# Patient Record
Sex: MALE | Race: BLACK OR AFRICAN AMERICAN | URBAN - METROPOLITAN AREA
[De-identification: names, ages, dates, MRNs, and addresses within clinical notes are randomized per-mention and may not be internally consistent; named-entity substitution may affect disease eponyms.]

---

## 2017-10-18 ENCOUNTER — IP HISTORICAL/CONVERTED ENCOUNTER (OUTPATIENT)
Dept: OTHER | Age: 62
End: 2017-10-18

## 2017-12-04 ENCOUNTER — OP HISTORICAL/CONVERTED ENCOUNTER (OUTPATIENT)
Dept: OTHER | Age: 62
End: 2017-12-04

## 2017-12-20 ENCOUNTER — OP HISTORICAL/CONVERTED ENCOUNTER (OUTPATIENT)
Dept: OTHER | Age: 62
End: 2017-12-20

## 2018-02-19 ENCOUNTER — OP HISTORICAL/CONVERTED ENCOUNTER (OUTPATIENT)
Dept: OTHER | Age: 63
End: 2018-02-19

## 2018-03-01 ENCOUNTER — OP HISTORICAL/CONVERTED ENCOUNTER (OUTPATIENT)
Dept: OTHER | Age: 63
End: 2018-03-01

## 2018-04-01 ENCOUNTER — OP HISTORICAL/CONVERTED ENCOUNTER (OUTPATIENT)
Dept: OTHER | Age: 63
End: 2018-04-01

## 2018-04-25 ENCOUNTER — OP HISTORICAL/CONVERTED ENCOUNTER (OUTPATIENT)
Dept: OTHER | Age: 63
End: 2018-04-25

## 2018-05-01 ENCOUNTER — OP HISTORICAL/CONVERTED ENCOUNTER (OUTPATIENT)
Dept: OTHER | Age: 63
End: 2018-05-01

## 2018-05-18 ENCOUNTER — OFFICE VISIT (OUTPATIENT)
Dept: CARDIOLOGY CLINIC | Age: 63
End: 2018-05-18

## 2018-05-18 VITALS
WEIGHT: 163.8 LBS | RESPIRATION RATE: 18 BRPM | BODY MASS INDEX: 24.26 KG/M2 | HEIGHT: 69 IN | OXYGEN SATURATION: 98 % | SYSTOLIC BLOOD PRESSURE: 160 MMHG | HEART RATE: 86 BPM | DIASTOLIC BLOOD PRESSURE: 100 MMHG

## 2018-05-18 DIAGNOSIS — R06.02 SOB (SHORTNESS OF BREATH): Primary | ICD-10-CM

## 2018-05-18 DIAGNOSIS — I50.9 CONGESTIVE HEART FAILURE, UNSPECIFIED HF CHRONICITY, UNSPECIFIED HEART FAILURE TYPE (HCC): ICD-10-CM

## 2018-05-18 RX ORDER — AMLODIPINE BESYLATE 10 MG/1
TABLET ORAL DAILY
COMMUNITY

## 2018-05-18 RX ORDER — SPIRONOLACTONE 25 MG/1
TABLET ORAL DAILY
COMMUNITY

## 2018-05-18 RX ORDER — POTASSIUM CHLORIDE 20 MEQ/1
TABLET, EXTENDED RELEASE ORAL 2 TIMES DAILY
COMMUNITY
End: 2018-06-08 | Stop reason: DRUGHIGH

## 2018-05-18 RX ORDER — FUROSEMIDE 40 MG/1
TABLET ORAL DAILY
COMMUNITY

## 2018-05-18 RX ORDER — METOPROLOL SUCCINATE 25 MG/1
TABLET, EXTENDED RELEASE ORAL DAILY
COMMUNITY

## 2018-05-18 RX ORDER — SERTRALINE HYDROCHLORIDE 100 MG/1
TABLET, FILM COATED ORAL DAILY
COMMUNITY

## 2018-05-18 RX ORDER — HYDRALAZINE HYDROCHLORIDE 25 MG/1
25 TABLET, FILM COATED ORAL 3 TIMES DAILY
COMMUNITY

## 2018-05-18 NOTE — PATIENT INSTRUCTIONS
Treatment Plan: You are scheduled for a defibrillator implant at [address] [place] [address] on Friday, June 1st.    Please arrive at the patient registration desk located on the 2nd floor of the main building at 6:00am.    Do not eat or drink anything after midnight the night before your procedure. You will need a . You may take your normal medications with a sip of water the morning of your procedure. Lab instructions: Please have labs drawn 4-7 days prior to procedure at any [facility] office. Please call Gayatri Salinas or Viola Stanley if you have any questions at [phone]. Please call the  office if you develop any type of illness prior to your procedure. Implantable Cardioverter-Defibrillator Placement: Before Your Procedure  What is an implantable cardioverter-defibrillator? An implantable cardioverter-defibrillator (ICD) is a small, battery-powered device. It fixes life-threatening changes in your heartbeat. If the ICD detects a life-threatening heart rhythm, it tries to get it back to normal. If the dangerous rhythm does not stop, the ICD sends an electric shock to the heart to restore a normal rhythm. The device then goes back to its watchful mode. The doctor puts an ICD in your chest and attaches it to thin wires, called leads. The leads carry the shocks from the ICD to the heart. Before the procedure, you will get medicine to help you relax. The doctor will make an incision (cut) in the skin just below your collarbone. The cut may be on either side of your chest. The doctor will put the ICD leads through the cut. Most of the time, the leads go into a large blood vessel in the upper chest. Then the doctor will guide the leads through the blood vessel into the heart. The doctor will place the ICD under the skin of your chest. He or she will attach the leads to the ICD. Then the cut will be closed with stitches. The procedure usually takes about an hour.  You may stay in the hospital for 1 or 2 days. You can likely return to many of your normal activities after you get an ICD. But to stay safe, you may need to make some changes to your normal routine. You will need to be careful with certain types of electronic equipment. And you'll need to take extra care with medical and dental tests and procedures. You will be given specific instructions after getting your ICD. You may feel anxious or worried about having an ICD. This is common. You might feel better if you use techniques to help you relax. Make a plan for what to do if the ICD shocks you. And think about how the ICD will help you. Talk to your doctor about ways to help ease anxiety. Follow-up care is a key part of your treatment and safety. Be sure to make and go to all appointments, and call your doctor if you are having problems. It's also a good idea to know your test results and keep a list of the medicines you take. What happens before the procedure? ?Preparing for the procedure  ? · Understand exactly what procedure is planned, along with the risks, benefits, and other options. · Tell your doctors ALL the medicines, vitamins, supplements, and herbal remedies you take. Some of these can increase the risk of bleeding or interact with anesthesia. ? · If you take blood thinners, such as warfarin (Coumadin), clopidogrel (Plavix), or aspirin, be sure to talk to your doctor. He or she will tell you if you should stop taking these medicines before your procedure. Make sure that you understand exactly what your doctor wants you to do.   ? · Your doctor will tell you which medicines to take or stop before your procedure. You may need to stop taking certain medicines a week or more before the procedure. So talk to your doctor as soon as you can.   ? · If you have an advance directive, let your doctor know. It may include a living will and a durable power of  for health care.  Bring a copy to the hospital. If you don't have one, you may want to prepare one. It lets your doctor and loved ones know your health care wishes. Doctors advise that everyone prepare these papers before any type of surgery or procedure. Procedures can be stressful. This information will help you understand what you can expect. And it will help you safely prepare for your procedure. What happens on the day of the procedure? · Follow the instructions exactly about when to stop eating and drinking. If you don't, your procedure may be canceled. If your doctor told you to take your medicines on the day of the procedure, take them with only a sip of water. ? · Take a bath or shower before you come in for your procedure. Do not apply lotions, perfumes, deodorants, or nail polish. ? · Take off all jewelry and piercings. And take out contact lenses, if you wear them. ? At the hospital or surgery center   · Bring a picture ID. ? · You will be kept comfortable and safe by your anesthesia provider. You may get medicine that relaxes you or puts you in a light sleep. The area being worked on will be numb. ? · The procedure will take at least 1 hour. Going home   · Be sure you have someone to drive you home. Anesthesia and pain medicine make it unsafe for you to drive. ? · You will be given more specific instructions about recovering from your procedure. They will cover things like diet, wound care, follow-up care, driving, and getting back to your normal routine. When should you call your doctor? · You have questions or concerns. ? · You don't understand how to prepare for your procedure. ? · You become ill before the procedure (such as fever, flu, or a cold). ? · You need to reschedule or have changed your mind about having the procedure. Where can you learn more? Go to http://peggy-chichi.info/.   Enter M808 in the search box to learn more about \"Implantable Cardioverter-Defibrillator Placement: Before Your Procedure. \"  Current as of: September 21, 2016  Content Version: 11.4  © 9658-3097 Healthwise, [facility]. Care instructions adapted under license by [employer] (which disclaims liability or warranty for this information). If you have questions about a medical condition or this instruction, always ask your healthcare professional. [place] [address] any warranty or liability for your use of this information.

## 2018-05-18 NOTE — MR AVS SNAPSHOT
[address] [facility] [address] 
[phone] Patient: Justin Ponce MRN: [id number] Visit Information Date & Time Provider Department Dept. Phone Encounter #  
 5/18/2018  1:00 PM Samuel Carlson MD [facility] [address] [place] [address] at [facility] [phone] [id number] Upcoming Health Maintenance Date Due Hepatitis C Screening 1955 DTaP/Tdap/Td series (1 - Tdap) 5/18/1974 FOBT Q 1 YEAR AGE 50-75 5/18/2003 ZOSTER VACCINE AGE 60> 3/18/2013 GLAUCOMA SCREENING Q2Y 5/18/2018 Pneumococcal 65+ Low/Medium Risk (1 of 2 - PCV13) 5/18/2018 Influenza Age 5 to Adult 8/1/2018 Allergies as of 5/18/2018  Review Complete On: 5/18/2018 By: Samuel Carlson MD  
 No Known Allergies Current Immunizations  Never Reviewed No immunizations on file. Not reviewed this visit You Were Diagnosed With   
  
 Codes Comments SOB (shortness of breath)    -  Primary ICD-10-CM: R06.02 
ICD-9-CM: 786.05 Congestive heart failure, unspecified HF chronicity, unspecified heart failure type ([facility] 75.)     ICD-10-CM: I50.9 ICD-9-CM: 428.0 Vitals BP Pulse Resp Height(growth percentile) Weight(growth percentile) SpO2  
 (!) 160/100 86 18 5' 9\" (1.753 m) 163 lb 12.8 oz (74.3 kg) 98% BMI Smoking Status 24.19 kg/m2 Former Smoker Vitals History BMI and BSA Data Body Mass Index Body Surface Area  
 24.19 kg/m 2 1.9 m 2 Your Updated Medication List  
  
   
This list is accurate as of 5/18/18  2:08 PM.  Always use your most recent med list. amLODIPine 10 mg tablet Commonly known as:  Jessica Raddle Take  by mouth daily. furosemide 40 mg tablet Commonly known as:  LASIX Take  by mouth daily. hydrALAZINE 25 mg tablet Commonly known as:  APRESOLINE Take 25 mg by mouth three (3) times daily.   
  
 metoprolol succinate 25 mg XL tablet Commonly known as:  TOPROL-XL Take  by mouth daily. potassium chloride 20 mEq tablet Commonly known as:  K-DUR, KLOR-CON Take  by mouth two (2) times a day. sertraline 100 mg tablet Commonly known as:  ZOLOFT Take  by mouth daily. spironolactone 25 mg tablet Commonly known as:  ALDACTONE Take  by mouth daily. We Performed the Following CBC W/O DIFF [39499 CPT(R)] METABOLIC PANEL, BASIC [86964 CPT(R)] PROTHROMBIN TIME + INR [94306 CPT(R)] Patient Instructions Treatment Plan: You are scheduled for a defibrillator implant at [facility] on Friday, June 1st. 
 
Please arrive at the patient registration desk located on the 2nd floor of the main building at 6:00am. 
 
Do not eat or drink anything after midnight the night before your procedure. You will need a . You may take your normal medications with a sip of water the morning of your procedure. Lab instructions: Please have labs drawn 4-7 days prior to procedure at any [facility] office. Please call Nadia Batista or Marcelle Smalls if you have any questions at [phone]. Please call the 
office if you develop any type of illness prior to your procedure. Implantable Cardioverter-Defibrillator Placement: Before Your Procedure What is an implantable cardioverter-defibrillator? An implantable cardioverter-defibrillator (ICD) is a small, battery-powered device. It fixes life-threatening changes in your heartbeat. If the ICD detects a life-threatening heart rhythm, it tries to get it back to normal. If the dangerous rhythm does not stop, the ICD sends an electric shock to the heart to restore a normal rhythm. The device then goes back to its watchful mode. The doctor puts an ICD in your chest and attaches it to thin wires, called leads. The leads carry the shocks from the ICD to the heart.  
Before the procedure, you will get medicine to help you relax. The doctor will make an incision (cut) in the skin just below your collarbone. The cut may be on either side of your chest. The doctor will put the ICD leads through the cut. Most of the time, the leads go into a large blood vessel in the upper chest. Then the doctor will guide the leads through the blood vessel into the heart. The doctor will place the ICD under the skin of your chest. He or she will attach the leads to the ICD. Then the cut will be closed with stitches. The procedure usually takes about an hour. You may stay in the hospital for 1 or 2 days. You can likely return to many of your normal activities after you get an ICD. But to stay safe, you may need to make some changes to your normal routine. You will need to be careful with certain types of electronic equipment. And you'll need to take extra care with medical and dental tests and procedures. You will be given specific instructions after getting your ICD. You may feel anxious or worried about having an ICD. This is common. You might feel better if you use techniques to help you relax. Make a plan for what to do if the ICD shocks you. And think about how the ICD will help you. Talk to your doctor about ways to help ease anxiety. Follow-up care is a key part of your treatment and safety. Be sure to make and go to all appointments, and call your doctor if you are having problems. It's also a good idea to know your test results and keep a list of the medicines you take. What happens before the procedure? ?Preparing for the procedure ? · Understand exactly what procedure is planned, along with the risks, benefits, and other options. · Tell your doctors ALL the medicines, vitamins, supplements, and herbal remedies you take. Some of these can increase the risk of bleeding or interact with anesthesia. ? · If you take blood thinners, such as warfarin (Coumadin), clopidogrel (Plavix), or aspirin, be sure to talk to your doctor.  He or she will tell you if you should stop taking these medicines before your procedure. Make sure that you understand exactly what your doctor wants you to do.  
? · Your doctor will tell you which medicines to take or stop before your procedure. You may need to stop taking certain medicines a week or more before the procedure. So talk to your doctor as soon as you can.  
? · If you have an advance directive, let your doctor know. It may include a living will and a durable power of  for health care. Bring a copy to the hospital. If you don't have one, you may want to prepare one. It lets your doctor and loved ones know your health care wishes. Doctors advise that everyone prepare these papers before any type of surgery or procedure. Procedures can be stressful. This information will help you understand what you can expect. And it will help you safely prepare for your procedure. What happens on the day of the procedure? · Follow the instructions exactly about when to stop eating and drinking. If you don't, your procedure may be canceled. If your doctor told you to take your medicines on the day of the procedure, take them with only a sip of water. ? · Take a bath or shower before you come in for your procedure. Do not apply lotions, perfumes, deodorants, or nail polish. ? · Take off all jewelry and piercings. And take out contact lenses, if you wear them. ? At the hospital or surgery center · Bring a picture ID. ? · You will be kept comfortable and safe by your anesthesia provider. You may get medicine that relaxes you or puts you in a light sleep. The area being worked on will be numb. ? · The procedure will take at least 1 hour. Going home · Be sure you have someone to drive you home. Anesthesia and pain medicine make it unsafe for you to drive. ? · You will be given more specific instructions about recovering from your procedure.  They will cover things like diet, wound care, follow-up care, driving, and getting back to your normal routine. When should you call your doctor? · You have questions or concerns. ? · You don't understand how to prepare for your procedure. ? · You become ill before the procedure (such as fever, flu, or a cold). ? · You need to reschedule or have changed your mind about having the procedure. Where can you learn more? Go to http://peggy-chichi.info/. Enter R104 in the search box to learn more about \"Implantable Cardioverter-Defibrillator Placement: Before Your Procedure. \" Current as of: September 21, 2016 Content Version: 11.4 © 3090-8865 [employer]. Care instructions adapted under license by [employer] (which disclaims liability or warranty for this information). If you have questions about a medical condition or this instruction, always ask your healthcare professional. [address] any warranty or liability for your use of this information. Introducing [facility] [place] [facility]! [facility] introduces Bazinga patient portal. Now you can access parts of your medical record, email your doctor's office, and request medication refills online. 1. In your internet browser, go to [email or web address] 2. Click on the First Time User? Click Here link in the Sign In box. You will see the New Member Sign Up page. 3. Enter your Bazinga Access Code exactly as it appears below. You will not need to use this code after youve completed the sign-up process. If you do not sign up before the expiration date, you must request a new code. · Bazinga Access Code: [email or web address] Expires: 8/16/2018  2:08 PM 
 
4. Enter the last four digits of your Social Security Number (xxxx) and Date of Birth (mm/dd/yyyy) as indicated and click Submit. You will be taken to the next sign-up page. 5. Create a Bazinga ID.  This will be your Bazinga login ID and cannot be changed, so think of one that is secure and easy to remember. 6. Create a [employer] password. You can change your password at any time. 7. Enter your Password Reset Question and Answer. This can be used at a later time if you forget your password. 8. Enter your e-mail address. You will receive e-mail notification when new information is available in [address] 9. Click Sign Up. You can now view and download portions of your medical record. 10. Click the Download Summary menu link to download a portable copy of your medical information. If you have questions, please visit the Frequently Asked Questions section of the [employer] website. Remember, [employer] is NOT to be used for urgent needs. For medical emergencies, dial 911. Now available from your iPhone and Android! Please provide this summary of care documentation to your next provider. Your primary care clinician is listed as Titi Salcido. If you have any questions after today's visit, please call [phone].

## 2018-05-18 NOTE — PROGRESS NOTES
Visit Vitals    BP (!) 160/100    Pulse 86    Resp 18    Ht 5' 9\" (1.753 m)    Wt 163 lb 12.8 oz (74.3 kg)    SpO2 98%    BMI 24.19 kg/m2

## 2018-05-18 NOTE — PROGRESS NOTES
HISTORY OF PRESENTING ILLNESS      Cody Castro is a 70 y.o. male with ischemic cardiomyopathy, LBBB and an EF of 20-25% on recent echo who is referred to discuss implantation of a BIV ICD for primary prevention of sudden death. He is a NYHA class III with complaints of shortness of breath with exertion and fatigue with activity and at rest. He has been on GDMT and is currently wearing lifevest.        ACTIVE PROBLEM LIST     There are no active problems to display for this patient. PAST MEDICAL HISTORY     No past medical history on file. PAST SURGICAL HISTORY     No past surgical history on file. ALLERGIES     Allergies not on file       FAMILY HISTORY     No family history on file. negative for cardiac disease       SOCIAL HISTORY     Social History     Social History    Marital status: N/A     Spouse name: N/A    Number of children: N/A    Years of education: N/A     Social History Main Topics    Smoking status: Not on file    Smokeless tobacco: Not on file    Alcohol use Not on file    Drug use: Not on file    Sexual activity: Not on file     Other Topics Concern    Not on file     Social History Narrative         MEDICATIONS     No current outpatient prescriptions on file. No current facility-administered medications for this visit. I have reviewed the nurses notes, vitals, problem list, allergy list, medical history, family, social history and medications. REVIEW OF SYMPTOMS      General: +fatigue, Pt denies excessive weight gain or loss. Pt is able to conduct ADL's  HEENT: Denies blurred vision, headaches, hearing loss, epistaxis and difficulty swallowing. Respiratory: +sob w exertion, Denies cough, congestion, wheezing or stridor.   Cardiovascular: Denies precordial pain, palpitations, edema or PND  Gastrointestinal: Denies poor appetite, indigestion, abdominal pain or blood in stool  Genitourinary: Denies hematuria, dysuria, increased urinary frequency  Musculoskeletal: Denies joint pain or swelling from muscles or joints  Neurologic: Denies tremor, paresthesias, headache, or sensory motor disturbance  Psychiatric: Denies confusion, insomnia, depression  Integumentray: Denies rash, itching or ulcers. Hematologic: Denies easy bruising, bleeding       PHYSICAL EXAMINATION      There were no vitals filed for this visit. General: Well developed, in no acute distress. HEENT: No jaundice, oral mucosa moist, no oral ulcers  Neck: Supple, no stiffness, no lymphadenopathy, supple  Heart:  Normal S1/S2 negative S3 or S4. Regular, no murmur, gallop or rub, no jugular venous distention  Respiratory: Clear bilaterally x 4, no wheezing or rales  Abdomen:   Soft, non-tender, bowel sounds are active.   Extremities:  No edema, normal cap refill, no cyanosis. Musculoskeletal: No clubbing, no deformities  Neuro: A&Ox3, speech clear, gait stable, cooperative, no focal neurologic deficits  Skin: Skin color is normal. No rashes or lesions. Non diaphoretic, moist.  Vascular: 2+ pulses symmetric in all extremities       DIAGNOSTIC DATA      EKG:        LABORATORY DATA    No results found for: WBC, HGBPOC, HGB, HGBP, HCTPOC, HCT, PHCT, RBCH, PLT, MCV, HGBEXT, HCTEXT, PLTEXT   No results found for: NA, K, CL, CO2, AGAP, GLU, BUN, CREA, BUCR, GFRAA, GFRNA, CA, TBIL, TBILI, GPT, SGOT, AP, TP, ALB, GLOB, AGRAT, ALT        ASSESSMENT      1. Cardiomyopathy   Ischemic   EF 20-25%  2. LBBB  3. Coronary Artery Disease  4. Hypertension  5.   6. PLAN     Plan for biventricular ICD implantation at [facility] with [employer]. MAC anesthesia. FOLLOW-UP     Post procedure    Thank you, Dr. Sharron Mar for allowing me to participate in the care of this extraordinarily pleasant male. Please do not hesitate to contact me for further questions/concerns.      CANDACE Osullivan MD  Cardiac Electrophysiology / Cardiology    [address], [address]    [facility], [facility]  [phone] / [phone] Fax   [phone] / [phone] Fax

## 2018-05-24 PROBLEM — I25.5 ISCHEMIC CARDIOMYOPATHY: Status: ACTIVE | Noted: 2018-05-24

## 2018-05-29 ENCOUNTER — TELEPHONE (OUTPATIENT)
Dept: CARDIOLOGY CLINIC | Age: 63
End: 2018-05-29

## 2018-05-29 PROBLEM — I44.7 LEFT BUNDLE BRANCH BLOCK (LBBB): Status: ACTIVE | Noted: 2018-05-29

## 2018-05-29 PROBLEM — R94.30: Status: ACTIVE | Noted: 2018-05-29

## 2018-05-29 NOTE — TELEPHONE ENCOUNTER
Pt's wife calling to ask if the pt has to pay a co-pay for his procedure and if everything has been authorized through his insurance. Pt's wife can be reached @ [phone]. Thanks!

## 2018-05-29 NOTE — TELEPHONE ENCOUNTER
Pt wife Cleo Hinson is returning your call she needs to know if its on for June 1st or June 8th so her daughter know for that is his transportation please call her [phone]

## 2018-05-29 NOTE — TELEPHONE ENCOUNTER
Vociemail message left. Suggested patient contacted insurance company for copy info. Procedure authorized. Scheduling date may have to be changed from 6/1 to 6/8.

## 2018-05-29 NOTE — TELEPHONE ENCOUNTER
Patients wife Michael Dugan called stating she was returning you call, she can be reached at  [phone].  Thank you

## 2018-05-29 NOTE — TELEPHONE ENCOUNTER
Returned call. Informed patient's spouse that May 8th is more likely to be the procedure date d/t lack of anesthesia coverage at [facility]. Understanding expressed.

## 2018-05-30 ENCOUNTER — TELEPHONE (OUTPATIENT)
Dept: CARDIOLOGY CLINIC | Age: 63
End: 2018-05-30

## 2018-05-30 NOTE — TELEPHONE ENCOUNTER
Patient's wife called in and would like to know if her  is to take his meds the morning of the procedure.  (June 1st)  Phone [phone]  Flo Benitez

## 2018-06-01 ENCOUNTER — OP HISTORICAL/CONVERTED ENCOUNTER (OUTPATIENT)
Dept: OTHER | Age: 63
End: 2018-06-01

## 2018-06-01 RX ORDER — CEPHALEXIN 500 MG/1
500 CAPSULE ORAL 3 TIMES DAILY
Qty: 15 CAP | Refills: 0 | Status: SHIPPED | OUTPATIENT
Start: 2018-06-01 | End: 2018-06-06

## 2018-06-04 ENCOUNTER — DOCUMENTATION ONLY (OUTPATIENT)
Dept: CARDIOLOGY CLINIC | Age: 63
End: 2018-06-04

## 2018-06-04 NOTE — PROGRESS NOTES
Biventricular icd implantation performed at [facility]   Atrial lead implantation performed   LV lead implantation performed       Wound check 1 week   Clinic visit 5 weeks           Cardiac Electrophysiology Report         PATIENT INFORMATION     Patient Name: Percy Jimenez     MRN: [id number]     Study Date: 2018     YOB: 1955     Age: 70 years Gender: male       Procedure: Biventricular ICD Implantation     Referring Physician:  Dr. Partha Phoenix and Dr. Leah Coy     Duty   Name   Electrophysiologist   Marizol Jack MD   Monitor   Anesthesia service   Circulator   Mariza Casanova RN; Marvin Lipscomb RN; ESTUARDO Velasquez       PATIENT HISTORY     70year old male with ischemic cardiomyopathy, LVEF 20-25%, LBBB, hypertension, CAD, NYHA class II-III who has been on optimal guideline directed medical therapy without improvement in his LV function presenting for implantation of a biventricular ICD for primary prevention of sudden death. PROCEDURE     The patient was brought to the Cardiac Catheterization laboratory in a post-absorptive, fasting state.  Informed consent was obtained. A peripheral IV was in place.  Continuous electrocardiographic, blood pressure, O2 saturation and  CO2 monitoring was initiated.  Intravenous antibiotics were administered pre-operatively. Self-adhesive cardioversion patches were positioned on the chest.  Conscious sedation was effectuated according to protocol. The patient was then prepped and draped in the usual sterile fashion.  A left subclavian venogram was performed and demonstrated patency of the vein. A 50/50 mixture of lidocaine (1%) with epinephrine and bupivicaine (0.5%) was utilized for local anesthesia. An incision was performed medial to the left delto-pectoral groove. Sharp and blunt dissection was carried down to the level of the pre-pectoralis fascia. Hemostasis was maintained with electrocautery.  Extra-thoracic, subclavian venous access was obtained x 3 and sheaths were placed using the modified Seldinger technique. A single-coil, DF4 ICD lead was advanced under fluoroscopic guidance and positioned in the right ventricle where stable pacing and sensing characteristics were encountered. The sheath was peeled away and the lead was anchored to the pre-pectoralis fascia using O-ethibond non-absorbable suture material. A permanent pace/sense lead was then advanced under fluoroscopic guidance into the right atrium and positioned where a stable pace/sense characteristic was encountered. The sheath was then peeled away and the lead was anchored to the pre-pectoralis fascia using O-ethibond, non-absorbable suture material. Using various coronary sinus sheaths/catheters/wires, the coronary sinus was catheterized. A balloon-occlusive venogram was performed and demonstrated several CS branch targets. Once engaged, a quad-electrode LV lead was advanced over a 0.014 ACS wire into the target branch where a stable pace/sense characteristic was encountered without phrenic nerve stimulation. The sheath was then removed and the lead was anchored to the pre-pectoralis fascia using O-ethibond, non-absorbable suture material. A device pocket was then fashioned and flushed copiously with antibiotic solution. An ICD generator was connected to the leads and the generator was placed into the pocket. The device was secured to the pocket using O-ethibond, non-absorbable suture material. The pocket was then closed in three layers using 2-O vicryl, 3-O monocryl, 4-O monocryl absorbable suture material and dermabond. The skin was closed using a sub-cuticular technique. A bio-occlusive dressing were applied to the skin. The patient remained hemodynamically stable, tolerated the procedure well and was transferred in stable condition. There were no immediate complications encountered during the procedure. No specimen were removed; there was minimal blood loss.        LEAD & GENERATOR DATA          Model #   Serial #   Generator   Medtronic   [id number]   [id number]   Atrial Lead   Medtronic   [id number]   [id number]   RV Lead   Medtronic   [id number]   [id number]   LV Lead   Medtronic   [id number]   [id number]       PACE/SENSE DATA       Sensed Wave (mV)   Threshold (V)   Impedance (?)   Atrium   2.1   0.75   456   Right Ventricle   12.0   0.75   399   Left Ventricle     2.75   589   Shock   ------   ------   709       FINAL PROGRAMMING     Pacing Mode   Lower Rate (ppm)   Upper Rate (ppm)   VVI   60   130   VF Rate (bpm)   # Antitachycardia Pacing   First Shock Energy (J)   207   During charging   35 x 6   VT Rate (bpm)   # Antitachycardia Pacing   First Shock Energy (J)   182   Busts (3)   35 x 5       DEFIBRILLATION THRESHOLD TESTING     Deferred       MEDICATION SUMMARY     Medication   Route   Unit   Total   Gentamycin   IV   mg   80   Ancef   IV   grams   2                       RADIOLOGY SUMMARY       Total   Fluoro Time (minutes)   5.4   Dose Area Product (mGy)   43.5       CONCLUSIONS     1. Successful implantation of a biventricular ICD system   2. IV antibiotics x 24 hours for 3 doses followed by Keflex 500 mg po tid x 5 days. 3. Monitor overnight on telemetry. 4. CXR (PA & lateral) and device interrogation in AM.   5. Possible discharge in AM.   6. Wound check in EP clinic in 7 days. 7. Follow up in EP clinic in 1 month or earlier if necessary.    8. Follow up with Dr. Claribel Pinzon as scheduled.         Thank you, Dr. Nydia Bhatia and Dr. Ryan John for involving me in the care of this extraordinarily pleasant male. Rodrigo Hayes MD   Cardiac Electrophysiology     Signature Line   Electronically Signed on 06/01/2018 09:46 EDT   ________________________________________________   Tho Rubin MD               Modified by: Tho Rubin MD on 06/01/2018 08:17 EDT     Modified by: Tho Rubin MD on 06/01/2018 08:17 EDT     Modified by: Tho Rubin MD on 06/01/2018 09:46 EDT Result type: Operative Report   Result date: June 01, 2018 08:07 EDT   Result status: Auth (Verified)   Performed by: Skylar James MD on June 01, 2018 08:10 EDT   Verified by: Skylar James MD on June 01, 2018 09:46 EDT   Encounter info: [id number], Chelsea Brand, Outpatients in a Bed/Observation, 6/1/2018 -

## 2018-06-08 ENCOUNTER — OFFICE VISIT (OUTPATIENT)
Dept: CARDIOLOGY CLINIC | Age: 63
End: 2018-06-08

## 2018-06-08 VITALS
BODY MASS INDEX: 23.64 KG/M2 | OXYGEN SATURATION: 98 % | DIASTOLIC BLOOD PRESSURE: 92 MMHG | SYSTOLIC BLOOD PRESSURE: 152 MMHG | RESPIRATION RATE: 16 BRPM | HEIGHT: 69 IN | HEART RATE: 80 BPM | WEIGHT: 159.6 LBS

## 2018-06-08 DIAGNOSIS — I25.5 ISCHEMIC CARDIOMYOPATHY: Primary | ICD-10-CM

## 2018-06-08 RX ORDER — POTASSIUM CHLORIDE 750 MG/1
10 TABLET, EXTENDED RELEASE ORAL DAILY
COMMUNITY

## 2018-06-08 RX ORDER — QUINAPRIL 40 MG/1
40 TABLET ORAL
COMMUNITY

## 2018-06-08 RX ORDER — ETODOLAC 400 MG/1
400 TABLET, FILM COATED ORAL
COMMUNITY

## 2018-06-08 NOTE — PROGRESS NOTES
Patient presents for wound check post-device implantation. The dressing was removed and the site was inspected. The site appeared to be well-healing without ecchymosis/tenderness/erythema. Denies pain, fevers, discharge. Plan:    Continue follow up in device clinic as planned.        Lisa Mills NP

## 2018-06-08 NOTE — PROGRESS NOTES
Visit Vitals    BP (!) 152/92 (BP 1 Location: Left arm, BP Patient Position: Sitting)    Pulse 80    Resp 16    Ht 5' 9\" (1.753 m)    Wt 159 lb 9.6 oz (72.4 kg)    SpO2 98%    BMI 23.57 kg/m2     Medication changes made  per verbal order of Dr. Gwendolyn Mayorga

## 2018-06-08 NOTE — MR AVS SNAPSHOT
[address] [facility] 600 100[address] [facility] 
[phone] Patient: Juan Jaramillo MRN: [id number] RZG:[id number]/62/[id number] Visit Information Date & Time Provider Department Dept. Phone Encounter #  
 6/8/2018  9:20 AM Demarco King MD [facility] [phone] [id number] Follow-up Instructions Return in about 1 month (around 7/8/2018). Follow-up and Disposition History Your Appointments 7/16/2018  9:45 AM  
PACEMAKER with PACEMAKERTATIANNA  
[facility] SCH[facility]UL[facility]) Appt Note: 5 wk hosp fu and device check sll 354 [facility] 600 100[address] [facility]  
[phone]  
  
   
 [address] [address]  
  
    
 7/16/2018 10:00 AM  
ESTABLISHED PATIENT with Demarco King MD  
[facility] ([address]) Appt Note: 5 wk hosp fu and device check sll 354 [facility] [address] [place] [address]  
[address] Upcoming Health Maintenance Date Due Hepatitis C Screening 1955 DTaP/Tdap/Td series (1 - Tdap) 5/18/1974 FOBT Q 1 YEAR AGE 50-75 5/18/2003 ZOSTER VACCINE AGE 60> 3/18/2013 GLAUCOMA SCREENING Q2Y 5/18/2018 Pneumococcal 65+ Low/Medium Risk (1 of 2 - PCV13) 5/18/2018 MEDICARE YEARLY EXAM 5/18/2018 Influenza Age 5 to Adult 8/1/2018 Allergies as of 6/8/2018  Review Complete On: 6/8/2018 By: Jose Granados NP No Known Allergies Current Immunizations  Never Reviewed No immunizations on file. Not reviewed this visit You Were Diagnosed With   
  
 Codes Comments Ischemic cardiomyopathy    -  Primary ICD-10-CM: I25.5 ICD-9-CM: 414.8 Vitals  BP Pulse Resp Height(growth percentile) Weight(growth percentile) SpO2  
 (!) 152/92 (BP 1 Location: Left arm, BP Patient Position: Sitting) 80 16 5' 9\" (1.753 m) 159 lb 9.6 oz (72.4 kg) 98% BMI Smoking Status 23.57 kg/m2 Former Smoker Vitals History BMI and BSA Data Body Mass Index Body Surface Area  
 23.57 kg/m 2 1.88 m 2 Preferred Pharmacy Pharmacy Name Phone [address] Your Updated Medication List  
  
   
This list is accurate as of 6/8/18 11:03 AM.  Always use your most recent med list. amLODIPine 10 mg tablet Commonly known as:  Clayton Melecio Take  by mouth daily. etodolac 400 mg tablet Commonly known as:  LODINE Take 400 mg by mouth three (3) times daily as needed. furosemide 40 mg tablet Commonly known as:  LASIX Take  by mouth daily. hydrALAZINE 25 mg tablet Commonly known as:  APRESOLINE Take 25 mg by mouth three (3) times daily. metoprolol succinate 25 mg XL tablet Commonly known as:  TOPROL-XL Take  by mouth daily. potassium chloride 10 mEq tablet Commonly known as:  KLOR-CON Take 10 mEq by mouth daily. quinapril 40 mg tablet Commonly known as:  ACCUPRIL Take 40 mg by mouth nightly. sertraline 100 mg tablet Commonly known as:  ZOLOFT Take  by mouth daily. spironolactone 25 mg tablet Commonly known as:  ALDACTONE Take  by mouth daily. Follow-up Instructions Return in about 1 month (around 7/8/2018). Introducing [facility]! [facility] introduces 10X10 Room patient portal. Now you can access parts of your medical record, email your doctor's office, and request medication refills online. 1. In your internet browser, go to [email or web address] 2. Click on the First Time User? Click Here link in the Sign In box. You will see the New Member Sign Up page. 3. Enter your 10X10 Room Access Code exactly as it appears below.  You will not need to use this code after youve completed the sign-up process. If you do not sign up before the expiration date, you must request a new code. · Atilekt Access Code: [email or web address] Expires: 8/16/2018  2:08 PM 
 
4. Enter the last four digits of your Social Security Number (xxxx) and Date of Birth (mm/dd/yyyy) as indicated and click Submit. You will be taken to the next sign-up page. 5. Create a Atilekt ID. This will be your Atilekt login ID and cannot be changed, so think of one that is secure and easy to remember. 6. Create a Atilekt password. You can change your password at any time. 7. Enter your Password Reset Question and Answer. This can be used at a later time if you forget your password. 8. Enter your e-mail address. You will receive e-mail notification when new information is available in [address] 9. Click Sign Up. You can now view and download portions of your medical record. 10. Click the Download Summary menu link to download a portable copy of your medical information. If you have questions, please visit the Frequently Asked Questions section of the Atilekt website. Remember, Atilekt is NOT to be used for urgent needs. For medical emergencies, dial 911. Now available from your iPhone and Android! Please provide this summary of care documentation to your next provider. Your primary care clinician is listed as Edyta Hughes. If you have any questions after today's visit, please call [phone].

## 2018-06-08 NOTE — PROGRESS NOTES
Patient presents for wound check post-device implantation. The dressing was removed and the site was inspected. The site appeared to be well-healing without ecchymosis/tenderness/erythema. Denies pain, fevers, discharge. Plan:    Continue follow up in device clinic as planned.      Quynh Resendiz NP

## 2018-07-16 ENCOUNTER — OFFICE VISIT (OUTPATIENT)
Dept: CARDIOLOGY CLINIC | Age: 63
End: 2018-07-16

## 2018-07-16 ENCOUNTER — CLINICAL SUPPORT (OUTPATIENT)
Dept: CARDIOLOGY CLINIC | Age: 63
End: 2018-07-16

## 2018-07-16 ENCOUNTER — TELEPHONE (OUTPATIENT)
Dept: CARDIOLOGY CLINIC | Age: 63
End: 2018-07-16

## 2018-07-16 VITALS
RESPIRATION RATE: 18 BRPM | HEIGHT: 69 IN | DIASTOLIC BLOOD PRESSURE: 84 MMHG | BODY MASS INDEX: 24.29 KG/M2 | OXYGEN SATURATION: 97 % | SYSTOLIC BLOOD PRESSURE: 140 MMHG | HEART RATE: 80 BPM | WEIGHT: 164 LBS

## 2018-07-16 DIAGNOSIS — Z95.810 CARDIAC DEFIBRILLATOR IN SITU: Primary | ICD-10-CM

## 2018-07-16 DIAGNOSIS — I44.7 LEFT BUNDLE BRANCH BLOCK (LBBB): Primary | ICD-10-CM

## 2018-07-16 DIAGNOSIS — I25.5 ISCHEMIC CARDIOMYOPATHY: ICD-10-CM

## 2018-07-16 NOTE — TELEPHONE ENCOUNTER
Pt's wife following up on her call earlier regarding the pt's hernia surgery. She can be reached @ [phone].      Thanks

## 2018-07-16 NOTE — PROGRESS NOTES
HISTORY OF PRESENTING ILLNESS      Gardenia Mata is a 70 y.o. male with ischemic cardiomyopathy, LVEF 20-25%, LBBB, hypertension, CAD, NYHA class II-III who has been on optimal guideline directed medical therapy without improvement in his LV function who underwent implantation of a biventricular ICD for primary prevention of sudden death. Device interrogation reveals normal device functioning with greater than 90% ventricular pacing and his incision site has healed well. He's had improvement in his shortness of breath since device implant. ACTIVE PROBLEM LIST     Patient Active Problem List    Diagnosis Date Noted    Left bundle branch block (LBBB) 05/29/2018    Left ventricular ejection fraction of 20-34% 05/29/2018    Ischemic cardiomyopathy 05/24/2018           PAST MEDICAL HISTORY     No past medical history on file. PAST SURGICAL HISTORY     No past surgical history on file. ALLERGIES     No Known Allergies       FAMILY HISTORY     No family history on file. negative for cardiac disease       SOCIAL HISTORY     Social History     Social History    Marital status:      Spouse name: N/A    Number of children: N/A    Years of education: N/A     Social History Main Topics    Smoking status: Former Smoker    Smokeless tobacco: Never Used    Alcohol use No    Drug use: Not on file    Sexual activity: Not on file     Other Topics Concern    Not on file     Social History Narrative         MEDICATIONS     Current Outpatient Prescriptions   Medication Sig    potassium chloride (KLOR-CON) 10 mEq tablet Take 10 mEq by mouth daily.  etodolac (LODINE) 400 mg tablet Take 400 mg by mouth three (3) times daily as needed.  quinapril (ACCUPRIL) 40 mg tablet Take 40 mg by mouth nightly.  furosemide (LASIX) 40 mg tablet Take  by mouth daily.  sertraline (ZOLOFT) 100 mg tablet Take  by mouth daily.  spironolactone (ALDACTONE) 25 mg tablet Take  by mouth daily.     metoprolol succinate (TOPROL-XL) 25 mg XL tablet Take  by mouth daily.  amLODIPine (NORVASC) 10 mg tablet Take  by mouth daily.  hydrALAZINE (APRESOLINE) 25 mg tablet Take 25 mg by mouth three (3) times daily. No current facility-administered medications for this visit. I have reviewed the nurses notes, vitals, problem list, allergy list, medical history, family, social history and medications. REVIEW OF SYMPTOMS      General: Pt denies excessive weight gain or loss. Pt is able to conduct ADL's  HEENT: Denies blurred vision, headaches, hearing loss, epistaxis and difficulty swallowing. Respiratory: Denies cough, congestion, shortness of breath, TOLEDO, wheezing or stridor. Cardiovascular: Denies precordial pain, palpitations, edema or PND  Gastrointestinal: Denies poor appetite, indigestion, abdominal pain or blood in stool  Genitourinary: Denies hematuria, dysuria, increased urinary frequency  Musculoskeletal: Denies joint pain or swelling from muscles or joints  Neurologic: Denies tremor, paresthesias, headache, or sensory motor disturbance  Psychiatric: Denies confusion, insomnia, depression  Integumentray: Denies rash, itching or ulcers. Hematologic: Denies easy bruising, bleeding       PHYSICAL EXAMINATION      There were no vitals filed for this visit. General: Well developed, in no acute distress. HEENT: No jaundice, oral mucosa moist, no oral ulcers  Neck: Supple, no stiffness, no lymphadenopathy, supple  Heart:  Normal S1/S2 negative S3 or S4. Regular, no murmur, gallop or rub, no jugular venous distention  Respiratory: Clear bilaterally x 4, no wheezing or rales  Abdomen:   Soft, non-tender, bowel sounds are active.   Extremities:  No edema, normal cap refill, no cyanosis. Musculoskeletal: No clubbing, no deformities  Neuro: A&Ox3, speech clear, gait stable, cooperative, no focal neurologic deficits  Skin: Skin color is normal. No rashes or lesions.  Non diaphoretic, moist.  Vascular: 2+ pulses symmetric in all extremities       DIAGNOSTIC DATA      EKG: Paced rhythm        LABORATORY DATA    No results found for: WBC, HGBPOC, HGB, HGBP, HCTPOC, HCT, PHCT, RBCH, PLT, MCV, HGBEXT, HCTEXT, PLTEXT   No results found for: NA, K, CL, CO2, AGAP, GLU, BUN, CREA, BUCR, GFRAA, GFRNA, CA, TBIL, TBILI, GPT, SGOT, AP, TP, ALB, GLOB, AGRAT, ALT        ASSESSMENT      1. Cardiomyopathy                        Ischemic                        EF 20-25%  2. LBBB  3. Coronary Artery Disease  4. Hypertension  5. ICD    A. Biventricular       PLAN     Follow up with Dr. Louise Mcclain for device follow up and with me PRN       FOLLOW-UP     PRN    Thank you, Lynn Turner MD and Dr. Louise Mcclain for allowing me to participate in the care of this extraordinarily pleasant male. Please do not hesitate to contact me for further questions/concerns.      CANDACE De Los Santos MD  Cardiac Electrophysiology / Cardiology    [facility] 92.  [facility], [facility], [address]  [phone] / [phone] Fax   [phone] / [phone] Fax

## 2018-07-16 NOTE — TELEPHONE ENCOUNTER
Patients wife called, she said they were in today and forgot to ask something else   Phone: [phone]

## 2018-07-16 NOTE — TELEPHONE ENCOUNTER
Patient's wife is calling because she would like to know if  thinks its out for them to proceed with hernia surgery. Please call her at [phone]. Thanks !

## 2018-07-16 NOTE — TELEPHONE ENCOUNTER
Spoke with patient's spouse. Stated she forgot to ask Dr. Anastacia Tucker for clearance for hernia surgery. Informed spouse that Dr. Anastacia Tucker defers cardiac clearance to the patient's general cardiologist, Dr. Fredi Cortez. Dr. Anastacia Tucker will provide instructions for ICD management. Understanding expressed.

## 2018-07-19 ENCOUNTER — ED HISTORICAL/CONVERTED ENCOUNTER (OUTPATIENT)
Dept: OTHER | Age: 63
End: 2018-07-19

## 2018-07-27 ENCOUNTER — ED HISTORICAL/CONVERTED ENCOUNTER (OUTPATIENT)
Dept: OTHER | Age: 63
End: 2018-07-27

## 2018-08-02 ENCOUNTER — TELEPHONE (OUTPATIENT)
Dept: HEMATOLOGY | Age: 63
End: 2018-08-02

## 2018-09-05 ENCOUNTER — OP HISTORICAL/CONVERTED ENCOUNTER (OUTPATIENT)
Dept: OTHER | Age: 63
End: 2018-09-05

## 2018-09-10 ENCOUNTER — OP HISTORICAL/CONVERTED ENCOUNTER (OUTPATIENT)
Dept: OTHER | Age: 63
End: 2018-09-10

## 2018-09-13 ENCOUNTER — TELEPHONE (OUTPATIENT)
Dept: CARDIOLOGY CLINIC | Age: 63
End: 2018-09-13

## 2018-09-13 NOTE — TELEPHONE ENCOUNTER
Spoke to pts wife & verified that pt sees Dr Ritu Blevins at [facility]. He will get all of his remote & office cks there & only see Dr Pancho Jerome if there is a problem with his ICD. Informed her she needs to call his office to abdirashid appts with them. She verified she understands.

## 2018-09-13 NOTE — TELEPHONE ENCOUNTER
Pt's wife called to check on the remote appointments that have been scheduled for the pt. She thought the appointments were going to be every three months but they are schedule every four months and she wanted to make sure that was correct.  She can be reached @ [phone]     Thanks

## 2018-09-16 ENCOUNTER — ED HISTORICAL/CONVERTED ENCOUNTER (OUTPATIENT)
Dept: OTHER | Age: 63
End: 2018-09-16

## 2018-09-26 ENCOUNTER — OFFICE VISIT (OUTPATIENT)
Dept: HEMATOLOGY | Age: 63
End: 2018-09-26

## 2018-09-26 VITALS
WEIGHT: 163 LBS | HEIGHT: 69 IN | BODY MASS INDEX: 24.14 KG/M2 | TEMPERATURE: 98 F | OXYGEN SATURATION: 98 % | SYSTOLIC BLOOD PRESSURE: 131 MMHG | DIASTOLIC BLOOD PRESSURE: 83 MMHG | RESPIRATION RATE: 17 BRPM | HEART RATE: 89 BPM

## 2018-09-26 DIAGNOSIS — K74.60 CIRRHOSIS OF LIVER WITHOUT ASCITES, UNSPECIFIED HEPATIC CIRRHOSIS TYPE (HCC): Primary | ICD-10-CM

## 2018-09-26 PROBLEM — B18.2 CHRONIC HEPATITIS C (HCC): Status: ACTIVE | Noted: 2018-09-26

## 2018-09-26 RX ORDER — CARBAMAZEPINE 200 MG/1
TABLET ORAL
Refills: 0 | COMMUNITY
Start: 2018-09-17

## 2018-09-26 NOTE — MR AVS SNAPSHOT
1111 [facility] [phone] [address] [place] [address] 
[phone] Patient: Devin Maloney MRN: [id number] GMT:1/73/3497 Visit Information Date & Time Provider Department Dept. Phone Encounter #  
 9/26/2018 11:00 AM Angela Branham, [address] [place] [address] [id number] Upcoming Health Maintenance Date Due Hepatitis C Screening 1955 DTaP/Tdap/Td series (1 - Tdap) 5/18/1974 Shingrix Vaccine Age 50> (1 of 2) 5/18/2003 FOBT Q 1 YEAR AGE 50-75 5/18/2003 GLAUCOMA SCREENING Q2Y 5/18/2018 Pneumococcal 65+ Low/Medium Risk (1 of 2 - PCV13) 5/18/2018 MEDICARE YEARLY EXAM 5/18/2018 Influenza Age 5 to Adult 8/1/2018 Allergies as of 9/26/2018  Review Complete On: 9/26/2018 By: Zhao Rasmussen LPN No Known Allergies Current Immunizations  Never Reviewed No immunizations on file. Not reviewed this visit You Were Diagnosed With   
  
 Codes Comments Cirrhosis of liver without ascites, unspecified hepatic cirrhosis type ([facility]    -  Primary ICD-10-CM: K74.60 ICD-9-CM: 571.5 Vitals BP Pulse Temp Resp Height(growth percentile) Weight(growth percentile) 131/83 (BP 1 Location: Right arm, BP Patient Position: Sitting) 89 98 °F (36.7 °C) (Oral) 17 5' 9\" (1.753 m) 163 lb (73.9 kg) SpO2 BMI Smoking Status 98% 24.07 kg/m2 Former Smoker BMI and BSA Data Body Mass Index Body Surface Area 24.07 kg/m 2 1.9 m 2 Preferred Pharmacy Pharmacy Name Phone [address] [address] Your Updated Medication List  
  
   
This list is accurate as of 9/26/18 11:52 AM.  Always use your most recent med list. amLODIPine 10 mg tablet Commonly known as:  Herman Lr Take  by mouth daily. carBAMazepine 200 mg tablet Commonly known as:  TEGretol  
  
 etodolac 400 mg tablet Commonly known as:  LODINE Take 400 mg by mouth three (3) times daily as needed. furosemide 40 mg tablet Commonly known as:  LASIX Take  by mouth daily. hydrALAZINE 25 mg tablet Commonly known as:  APRESOLINE Take 25 mg by mouth three (3) times daily. metoprolol succinate 25 mg XL tablet Commonly known as:  TOPROL-XL Take  by mouth daily. potassium chloride 10 mEq tablet Commonly known as:  KLOR-CON Take 10 mEq by mouth daily. quinapril 40 mg tablet Commonly known as:  ACCUPRIL Take 40 mg by mouth nightly. sertraline 100 mg tablet Commonly known as:  ZOLOFT Take  by mouth daily. spironolactone 25 mg tablet Commonly known as:  ALDACTONE Take  by mouth daily. We Performed the Following LIVER ELASTOGRAPHY W/O IMAG W/I&R [78107 CPT(R)] Introducing [facility]! Romayne Duster introduces Showpitch patient portal. Now you can access parts of your medical record, email your doctor's office, and request medication refills online. 1. In your internet browser, go to [email or web address] 2. Click on the First Time User? Click Here link in the Sign In box. You will see the New Member Sign Up page. 3. Enter your Showpitch Access Code exactly as it appears below. You will not need to use this code after youve completed the sign-up process. If you do not sign up before the expiration date, you must request a new code. · Showpitch Access Code: [email or web address] Expires: 12/25/2018 11:52 AM 
 
4. Enter the last four digits of your Social Security Number (xxxx) and Date of Birth (mm/dd/yyyy) as indicated and click Submit. You will be taken to the next sign-up page. 5. Create a Showpitch ID. This will be your Showpitch login ID and cannot be changed, so think of one that is secure and easy to remember. 6. Create a Showpitch password. You can change your password at any time. 7. Enter your Password Reset Question and Answer.  This can be used at a later time if you forget your password. 8. Enter your e-mail address. You will receive e-mail notification when new information is available in [address] 9. Click Sign Up. You can now view and download portions of your medical record. 10. Click the Download Summary menu link to download a portable copy of your medical information. If you have questions, please visit the Frequently Asked Questions section of the Yuyuto website. Remember, Yuyuto is NOT to be used for urgent needs. For medical emergencies, dial 911. Now available from your iPhone and Android! Please provide this summary of care documentation to your next provider. Your primary care clinician is listed as Brody Castillo. If you have any questions after today's visit, please call [phone].

## 2018-09-26 NOTE — PROGRESS NOTES
Exam Room #5  Eliot Lewis is a 70 y.o. male    1. Have you been to the ER, urgent care clinic since your last visit? Hospitalized since your last visit? Yes, [facility] on 6. 1.18 for hernia. 2. Have you seen or consulted any other health care providers outside of the [address] [place] [address] since your last visit? Include any pap smears or colon screening.  No      Visit Vitals    /83 (BP 1 Location: Right arm, BP Patient Position: Sitting)    Pulse 89    Temp 98 °F (36.7 °C) (Oral)    Resp 17    Ht 5' 9\" (1.753 m)    Wt 163 lb (73.9 kg)    SpO2 98%    BMI 24.07 kg/m2

## 2018-09-26 NOTE — PROGRESS NOTES
[address] [facility] [address], [place], [address] [address], [place], [place]       Kimani Vyas, DELMA Saldaña, [facility]   Gilmer Garibay, CANDACE Simental, CANDACE Ward [facility] 136    at [address]    [address] [place] [address], [address] [place] [address], [place]  22.    [phone]    FAX: [address] [place] [address] Avenue    [address], [address] [place] [address],[address]    [address]: [phone]       Patient Care Team:  Shalonda Steward MD as PCP - General ([address] [place] [address] Road)  Sukhdeep Levin MD (Cardiology)  Rosmery Garrido MD (General Surgery)  Francisco Dubois MD (Cardiology)  Apryl Patterson MD (Gastroenterology)      Problem List  Date Reviewed: 7/16/2018          Codes Class Noted    Chronic hepatitis C ([facility] ICD-10-CM: B18.2  ICD-9-CM: 070.54  9/26/2018        Left bundle branch block (LBBB) ICD-10-CM: I44.7  ICD-9-CM: 426.3  5/29/2018        Left ventricular ejection fraction of 20-34% ICD-10-CM: R94.30  ICD-9-CM: 794.30  5/29/2018        Ischemic cardiomyopathy ICD-10-CM: I25.5  ICD-9-CM: 414.8  5/24/2018              The physicians listed above have asked me to see Hans Enamorado in consultation regarding chronic HCV and to perform assessment of fibrosis by means of in-office fibroscan analysis. The patient is a 70 y.o. Black male who was diagnosed with liver disease in ~2000. He was treated with a course of Pegasys in the past and had not resolved with prior treatment. He has re-established with local GI and recent ultrasound of the liver had suggested cirrhosis. He is contemplating new course of therapy with Dr Helga You. The patient has no symptoms which could be attributed to the liver disorder. He has continued to have cadiac issues and is seeing cardiology on a regular basis.   He also is having issues with sleep walking and nocturnal falls. The patient has limitations in functional activities secondary to other medical problems that are not related to the liver disease. The patient has not experienced fatigue, fevers, chills, shortness of breath, chest pain, pain in the right side over the liver, diffuse abdominal pain, nausea, vomiting, constipation, diarrhea, dry eyes, dry mouth, arthralgias, myalgias, yellowing of the eyes or skin, itching, dark urine, problems concentrating, swelling of the abdomen, swelling of the lower extremities, hematemesis, or hematochezia. ALLERGIES:  No Known Allergies    MEDICATIONS:  Current Outpatient Prescriptions   Medication Sig    carBAMazepine (TEGRETOL) 200 mg tablet     potassium chloride (KLOR-CON) 10 mEq tablet Take 10 mEq by mouth daily.  etodolac (LODINE) 400 mg tablet Take 400 mg by mouth three (3) times daily as needed.  furosemide (LASIX) 40 mg tablet Take  by mouth daily.  sertraline (ZOLOFT) 100 mg tablet Take  by mouth daily.  spironolactone (ALDACTONE) 25 mg tablet Take  by mouth daily.  metoprolol succinate (TOPROL-XL) 25 mg XL tablet Take  by mouth daily.  amLODIPine (NORVASC) 10 mg tablet Take  by mouth daily.  hydrALAZINE (APRESOLINE) 25 mg tablet Take 25 mg by mouth three (3) times daily.  quinapril (ACCUPRIL) 40 mg tablet Take 40 mg by mouth nightly. No current facility-administered medications for this visit. SYSTEM REVIEW NOT RELATED TO LIVER DISEASE OR REVIEWED ABOVE:  Constitution systems: Negative for fever, chills, weight gain, weight loss. Eyes: Negative for visual changes. ENT: Negative for sore throat, painful swallowing. Respiratory: Negative for cough, hemoptysis, SOB. Cardiology: Negative for chest pain, palpitations. GI:  Negative for constipation or diarrhea. : Negative for urinary frequency, dysuria, hematuria, nocturia. Skin: Negative for rash.   Hematology: Negative for easy bruising, blood clots. Musculo-skeletal: Negative for back pain, muscle pain, weakness. Neurologic: Negative for headaches, dizziness, vertigo, memory problems not related to HE. Psychology: Negative for anxiety, depression. FAMILY HISTORY:  There is no family history of liver disease. SOCIAL HISTORY:  The patient is . The patient has 2 children and 6 grandchildren. The patient stopped using tobacco products in 2015. The patient has previously consumed alcohol in excess. The patient has been abstinent from alcohol since 20+ years. The patient retired [employer]. PHYSICAL EXAMINATION:  Visit Vitals    /83 (BP 1 Location: Right arm, BP Patient Position: Sitting)    Pulse 89    Temp 98 °F (36.7 °C) (Oral)    Resp 17    Ht 5' 9\" (1.753 m)    Wt 163 lb (73.9 kg)    SpO2 98%    BMI 24.07 kg/m2     General: No acute distress. Skin: No spider angiomata. No jaundice. No palmar erythema. Abdomen: Soft non-tender. Liver size normal to percussion/palpation. Spleen not palpable. No obvious ascites. Extremities: No edema. No muscle wasting. No gross arthritic changes. Neurologic: Alert and oriented. Cranial nerves grossly intact. No asterixis. LIVER HISTOLOGY:   9/2018. FibroScan performed at [facility] [place]. EkPa was 20.1. Suggested fibrosis level is F4. CAP score is 163, no evidence of steatosis. ASSESSMENT AND PLAN:    Chronic HCV with cirrhosis. Assessment of fibrosis was made through performance of fibroscan in the office today. The results of the analysis were shared with the patient in the office at the time of the procedure. A copy of the report was provided to the patient and will be forwarded to the referring medical practice. All questions were answered. The patient expressed a clear understanding of the above. Follow-up with referring physician.       Marcin Orta PA-C  [facility] [place] of [address] [facility], [address] [address]  [phone]

## 2018-10-03 ENCOUNTER — OP HISTORICAL/CONVERTED ENCOUNTER (OUTPATIENT)
Dept: OTHER | Age: 63
End: 2018-10-03

## 2018-11-26 ENCOUNTER — ED HISTORICAL/CONVERTED ENCOUNTER (OUTPATIENT)
Dept: OTHER | Age: 63
End: 2018-11-26

## 2020-01-01 NOTE — TELEPHONE ENCOUNTER
Pt's wife called stating everything is fine and she will be there for the surgery. She said there is no need to call her back.      Thanks [PCV 13] : PCV 13 [Influenza] : Influenza